# Patient Record
Sex: FEMALE | Race: ASIAN | NOT HISPANIC OR LATINO | Employment: UNEMPLOYED | URBAN - METROPOLITAN AREA
[De-identification: names, ages, dates, MRNs, and addresses within clinical notes are randomized per-mention and may not be internally consistent; named-entity substitution may affect disease eponyms.]

---

## 2018-08-30 ENCOUNTER — HOSPITAL ENCOUNTER (OUTPATIENT)
Dept: RADIOLOGY | Facility: MEDICAL CENTER | Age: 58
End: 2018-08-30
Attending: UROLOGY
Payer: COMMERCIAL

## 2018-08-30 ENCOUNTER — HOSPITAL ENCOUNTER (OUTPATIENT)
Dept: RADIOLOGY | Facility: MEDICAL CENTER | Age: 58
End: 2018-08-30
Attending: INTERNAL MEDICINE
Payer: COMMERCIAL

## 2018-08-30 DIAGNOSIS — N20.0 CALCULUS OF KIDNEY: ICD-10-CM

## 2018-08-30 DIAGNOSIS — Z12.31 VISIT FOR SCREENING MAMMOGRAM: ICD-10-CM

## 2018-08-30 PROCEDURE — 74018 RADEX ABDOMEN 1 VIEW: CPT

## 2018-08-30 PROCEDURE — 77067 SCR MAMMO BI INCL CAD: CPT

## 2019-10-10 ENCOUNTER — HOSPITAL ENCOUNTER (OUTPATIENT)
Dept: RADIOLOGY | Facility: MEDICAL CENTER | Age: 59
End: 2019-10-10
Attending: UROLOGY
Payer: COMMERCIAL

## 2019-10-10 DIAGNOSIS — N20.0 CALCULUS OF KIDNEY: ICD-10-CM

## 2019-10-10 PROCEDURE — 76775 US EXAM ABDO BACK WALL LIM: CPT

## 2019-10-15 ENCOUNTER — HOSPITAL ENCOUNTER (OUTPATIENT)
Dept: RADIOLOGY | Facility: MEDICAL CENTER | Age: 59
End: 2019-10-15
Attending: INTERNAL MEDICINE
Payer: COMMERCIAL

## 2019-10-15 DIAGNOSIS — Z12.31 VISIT FOR SCREENING MAMMOGRAM: ICD-10-CM

## 2019-10-15 PROCEDURE — 77063 BREAST TOMOSYNTHESIS BI: CPT

## 2020-03-02 ENCOUNTER — APPOINTMENT (OUTPATIENT)
Dept: RADIOLOGY | Facility: MEDICAL CENTER | Age: 60
End: 2020-03-02
Attending: INTERNAL MEDICINE
Payer: COMMERCIAL

## 2020-03-19 ENCOUNTER — HOSPITAL ENCOUNTER (OUTPATIENT)
Dept: RADIOLOGY | Facility: MEDICAL CENTER | Age: 60
End: 2020-03-19
Attending: INTERNAL MEDICINE
Payer: COMMERCIAL

## 2020-03-19 DIAGNOSIS — M81.0 SENILE OSTEOPOROSIS: ICD-10-CM

## 2020-03-19 PROCEDURE — 77080 DXA BONE DENSITY AXIAL: CPT

## 2022-01-17 ENCOUNTER — HOSPITAL ENCOUNTER (OUTPATIENT)
Dept: RADIOLOGY | Facility: MEDICAL CENTER | Age: 62
End: 2022-01-17
Attending: INTERNAL MEDICINE
Payer: COMMERCIAL

## 2022-01-17 DIAGNOSIS — Z85.3 PERSONAL HISTORY OF MALIGNANT NEOPLASM OF BREAST: ICD-10-CM

## 2022-01-17 DIAGNOSIS — Z12.31 VISIT FOR SCREENING MAMMOGRAM: ICD-10-CM

## 2022-01-17 PROCEDURE — 77063 BREAST TOMOSYNTHESIS BI: CPT

## 2023-02-06 ENCOUNTER — HOSPITAL ENCOUNTER (OUTPATIENT)
Dept: RADIOLOGY | Facility: MEDICAL CENTER | Age: 63
End: 2023-02-06
Attending: RADIOLOGY
Payer: COMMERCIAL

## 2023-02-06 DIAGNOSIS — Z12.31 VISIT FOR SCREENING MAMMOGRAM: ICD-10-CM

## 2023-02-15 ENCOUNTER — HOSPITAL ENCOUNTER (OUTPATIENT)
Dept: RADIOLOGY | Facility: MEDICAL CENTER | Age: 63
End: 2023-02-15
Attending: INTERNAL MEDICINE
Payer: COMMERCIAL

## 2023-02-15 DIAGNOSIS — N64.59 BREAST SIGNS AND SYMPTOMS: ICD-10-CM

## 2023-02-15 PROCEDURE — 76642 ULTRASOUND BREAST LIMITED: CPT | Mod: LT

## 2023-02-15 PROCEDURE — G0279 TOMOSYNTHESIS, MAMMO: HCPCS

## 2024-02-21 ENCOUNTER — HOSPITAL ENCOUNTER (OUTPATIENT)
Dept: RADIOLOGY | Facility: MEDICAL CENTER | Age: 64
End: 2024-02-21
Attending: INTERNAL MEDICINE
Payer: COMMERCIAL

## 2024-02-21 DIAGNOSIS — Z12.31 ENCOUNTER FOR SCREENING MAMMOGRAM FOR MALIGNANT NEOPLASM OF BREAST: ICD-10-CM

## 2024-02-21 PROCEDURE — 77067 SCR MAMMO BI INCL CAD: CPT

## 2024-09-18 ENCOUNTER — TELEPHONE (OUTPATIENT)
Dept: INTERNAL MEDICINE | Facility: OTHER | Age: 64
End: 2024-09-18
Payer: COMMERCIAL

## 2024-09-18 NOTE — TELEPHONE ENCOUNTER
Patient called to ask if she can establish care with Dr Barnes.  She stated that a friend of hers, a nurse that worked at [facility], recommended Dr Barnes.  She said her name was Salud (patient does not know her last name).  I did inform her that Dr Barnes is typically not accepting new patients but that I would send this encounter still asking.

## 2024-09-30 ENCOUNTER — OFFICE VISIT (OUTPATIENT)
Dept: MEDICAL GROUP | Facility: CLINIC | Age: 64
End: 2024-09-30
Payer: COMMERCIAL

## 2024-09-30 VITALS
WEIGHT: 93.7 LBS | SYSTOLIC BLOOD PRESSURE: 150 MMHG | HEIGHT: 60 IN | DIASTOLIC BLOOD PRESSURE: 74 MMHG | HEART RATE: 74 BPM | OXYGEN SATURATION: 96 % | TEMPERATURE: 98 F | BODY MASS INDEX: 18.4 KG/M2

## 2024-09-30 DIAGNOSIS — R31.21 ASYMPTOMATIC MICROSCOPIC HEMATURIA: ICD-10-CM

## 2024-09-30 DIAGNOSIS — R51.9 NONINTRACTABLE HEADACHE, UNSPECIFIED CHRONICITY PATTERN, UNSPECIFIED HEADACHE TYPE: ICD-10-CM

## 2024-09-30 DIAGNOSIS — M79.18 MUSCLE ACHE OF EXTREMITY: ICD-10-CM

## 2024-09-30 DIAGNOSIS — I10 HYPERTENSION, UNSPECIFIED TYPE: ICD-10-CM

## 2024-09-30 DIAGNOSIS — R07.9 CHEST PAIN, UNSPECIFIED TYPE: ICD-10-CM

## 2024-09-30 DIAGNOSIS — Z23 NEED FOR VACCINATION: ICD-10-CM

## 2024-09-30 DIAGNOSIS — R73.03 PREDIABETES: ICD-10-CM

## 2024-09-30 RX ORDER — CLONIDINE HYDROCHLORIDE 0.1 MG/1
0.1 TABLET ORAL 2 TIMES DAILY
COMMUNITY

## 2024-09-30 RX ORDER — MONTELUKAST SODIUM 10 MG/1
10 TABLET ORAL DAILY
COMMUNITY

## 2024-09-30 RX ORDER — ALENDRONATE SODIUM 70 MG/1
70 TABLET ORAL
Qty: 13 TABLET | Refills: 1 | Status: SHIPPED | OUTPATIENT
Start: 2024-09-30

## 2024-09-30 ASSESSMENT — PATIENT HEALTH QUESTIONNAIRE - PHQ9: CLINICAL INTERPRETATION OF PHQ2 SCORE: 0

## 2024-09-30 NOTE — ASSESSMENT & PLAN NOTE
Pt reports headaches which sounds like tension-type as they come from shoulder and neck. Worse on left side. She reports her clonodine helps.

## 2024-09-30 NOTE — PROGRESS NOTES
Subjective:     CC:  Diagnoses of Need for vaccination, Hypertension, unspecified type, Nonintractable headache, unspecified chronicity pattern, unspecified headache type, Prediabetes, Asymptomatic microscopic hematuria, Chest pain, unspecified type, and Muscle ache of extremity were pertinent to this visit.    HISTORY OF THE PRESENT ILLNESS: Patient is a 64 y.o. female. This pleasant patient is here today to establish care and discuss medications. She used to go to [facility] but the doctor left so she came here.    She needs a refill of osteoporosis medication, she has been out over a week.     Overdue on pap smear.    Breast cancer in 2004    Problem   Hypertension   Headache   Chest Pain   Muscle Ache of Extremity       ROS:   ROS      Objective:       Exam: BP (!) 150/74   Pulse 74   Temp 36.7 °C (98 °F)   Ht 1.524 m (5')   Wt 42.5 kg (93 lb 11.2 oz)   SpO2 96%  Body mass index is 18.3 kg/m².    Physical Exam  Constitutional:       General: She is not in acute distress.     Appearance: Normal appearance. She is not toxic-appearing.   HENT:      Head: Normocephalic and atraumatic.      Mouth/Throat:      Mouth: Mucous membranes are moist.   Eyes:      General: No scleral icterus.     Extraocular Movements: Extraocular movements intact.   Cardiovascular:      Rate and Rhythm: Normal rate and regular rhythm.      Pulses: Normal pulses.      Heart sounds: Normal heart sounds. No murmur heard.     No friction rub. No gallop.   Pulmonary:      Effort: Pulmonary effort is normal. No respiratory distress.      Breath sounds: Normal breath sounds.   Abdominal:      General: Abdomen is flat. Bowel sounds are normal. There is no distension.      Palpations: Abdomen is soft.      Tenderness: There is no abdominal tenderness. There is no guarding.   Musculoskeletal:         General: No swelling.   Skin:     General: Skin is warm and dry.      Capillary Refill: Capillary refill takes less than 2 seconds.   Neurological:  "     General: No focal deficit present.      Mental Status: She is alert and oriented to person, place, and time.   Psychiatric:         Mood and Affect: Mood normal.         Behavior: Behavior normal.         Thought Content: Thought content normal.         Judgment: Judgment normal.           Labs:  ordered    Assessment & Plan:   64 y.o. female with the following -    Problem List Items Addressed This Visit       Chest pain     Occasional chest pain, \"stretching\" and \"burning\" pain lasting 10-15min. Happens 3-4x/mo. No inciting event. First started in 2004 when she was diagnosed with breast cancer - she did receive chemo and radiation. Sometimes radiates from her jaw or abdomen. No SOB.  Never seen a cardiologist.  - In Office EKG  - CXR  - Cardiology         Relevant Orders    EKG - Clinic Performed    DX-CHEST-2 VIEWS    REFERRAL TO CARDIOLOGY    Headache     Pt reports headaches which sounds like tension-type as they come from shoulder and neck. Worse on left side. She reports her clonodine helps.           Hypertension     Need home BP log, then will plan to start ARB and dc clonidine.          Relevant Medications    cloNIDine (CATAPRES) 0.1 MG Tab    Other Relevant Orders    CBC WITH DIFFERENTIAL    Comp Metabolic Panel    HEMOGLOBIN A1C    TSH WITH REFLEX TO FT4    Muscle ache of extremity     Patient reports muscle aches in her legs.  She was told by her previous primary care doctor to try magnesium, which she has not attempted yet.  She agrees to try OTC magnesium.  If this is ineffective, we will follow-up on this issue at the next visit.  -OTC Mg supplement          Other Visit Diagnoses       Need for vaccination        Relevant Orders    INFLUENZA VACCINE TRI INJ (PF)    Prediabetes        Relevant Orders    HEMOGLOBIN A1C    Asymptomatic microscopic hematuria        Relevant Orders    URINALYSIS            I spent a total of 36 minutes with record review, exam, communication with the patient, " communication with other providers, and documentation of this encounter.    No follow-ups on file.

## 2024-09-30 NOTE — ASSESSMENT & PLAN NOTE
"Occasional chest pain, \"stretching\" and \"burning\" pain lasting 10-15min. Happens 3-4x/mo. No inciting event. First started in 2004 when she was diagnosed with breast cancer - she did receive chemo and radiation. Sometimes radiates from her jaw or abdomen. No SOB.  Never seen a cardiologist.  - In Office EKG  - CXR  - Cardiology  "

## 2024-10-01 NOTE — ASSESSMENT & PLAN NOTE
Patient reports muscle aches in her legs.  She was told by her previous primary care doctor to try magnesium, which she has not attempted yet.  She agrees to try OTC magnesium.  If this is ineffective, we will follow-up on this issue at the next visit.  -OTC Mg supplement

## 2024-10-02 ENCOUNTER — HOSPITAL ENCOUNTER (OUTPATIENT)
Dept: LAB | Facility: MEDICAL CENTER | Age: 64
End: 2024-10-02
Payer: COMMERCIAL

## 2024-10-02 DIAGNOSIS — I10 HYPERTENSION, UNSPECIFIED TYPE: ICD-10-CM

## 2024-10-02 DIAGNOSIS — R73.03 PREDIABETES: ICD-10-CM

## 2024-10-02 DIAGNOSIS — R31.21 ASYMPTOMATIC MICROSCOPIC HEMATURIA: ICD-10-CM

## 2024-10-02 LAB
ALBUMIN SERPL BCP-MCNC: 4.2 G/DL (ref 3.2–4.9)
ALBUMIN/GLOB SERPL: 1.4 G/DL
ALP SERPL-CCNC: 60 U/L (ref 30–99)
ALT SERPL-CCNC: 24 U/L (ref 2–50)
ANION GAP SERPL CALC-SCNC: 10 MMOL/L (ref 7–16)
APPEARANCE UR: CLEAR
AST SERPL-CCNC: 27 U/L (ref 12–45)
BASOPHILS # BLD AUTO: 1.7 % (ref 0–1.8)
BASOPHILS # BLD: 0.08 K/UL (ref 0–0.12)
BILIRUB SERPL-MCNC: 0.3 MG/DL (ref 0.1–1.5)
BILIRUB UR QL STRIP.AUTO: NEGATIVE
BUN SERPL-MCNC: 12 MG/DL (ref 8–22)
CALCIUM ALBUM COR SERPL-MCNC: 9.4 MG/DL (ref 8.5–10.5)
CALCIUM SERPL-MCNC: 9.6 MG/DL (ref 8.5–10.5)
CHLORIDE SERPL-SCNC: 104 MMOL/L (ref 96–112)
CO2 SERPL-SCNC: 27 MMOL/L (ref 20–33)
COLOR UR: YELLOW
CREAT SERPL-MCNC: 0.6 MG/DL (ref 0.5–1.4)
EOSINOPHIL # BLD AUTO: 0.09 K/UL (ref 0–0.51)
EOSINOPHIL NFR BLD: 1.9 % (ref 0–6.9)
ERYTHROCYTE [DISTWIDTH] IN BLOOD BY AUTOMATED COUNT: 45.4 FL (ref 35.9–50)
EST. AVERAGE GLUCOSE BLD GHB EST-MCNC: 123 MG/DL
GFR SERPLBLD CREATININE-BSD FMLA CKD-EPI: 101 ML/MIN/1.73 M 2
GLOBULIN SER CALC-MCNC: 2.9 G/DL (ref 1.9–3.5)
GLUCOSE SERPL-MCNC: 97 MG/DL (ref 65–99)
GLUCOSE UR STRIP.AUTO-MCNC: NEGATIVE MG/DL
HBA1C MFR BLD: 5.9 % (ref 4–5.6)
HCT VFR BLD AUTO: 40.5 % (ref 37–47)
HGB BLD-MCNC: 13.6 G/DL (ref 12–16)
IMM GRANULOCYTES # BLD AUTO: 0.01 K/UL (ref 0–0.11)
IMM GRANULOCYTES NFR BLD AUTO: 0.2 % (ref 0–0.9)
KETONES UR STRIP.AUTO-MCNC: NEGATIVE MG/DL
LEUKOCYTE ESTERASE UR QL STRIP.AUTO: NEGATIVE
LYMPHOCYTES # BLD AUTO: 1.55 K/UL (ref 1–4.8)
LYMPHOCYTES NFR BLD: 33 % (ref 22–41)
MCH RBC QN AUTO: 31.6 PG (ref 27–33)
MCHC RBC AUTO-ENTMCNC: 33.6 G/DL (ref 32.2–35.5)
MCV RBC AUTO: 94 FL (ref 81.4–97.8)
MICRO URNS: NORMAL
MONOCYTES # BLD AUTO: 0.36 K/UL (ref 0–0.85)
MONOCYTES NFR BLD AUTO: 7.7 % (ref 0–13.4)
NEUTROPHILS # BLD AUTO: 2.6 K/UL (ref 1.82–7.42)
NEUTROPHILS NFR BLD: 55.5 % (ref 44–72)
NITRITE UR QL STRIP.AUTO: NEGATIVE
NRBC # BLD AUTO: 0 K/UL
NRBC BLD-RTO: 0 /100 WBC (ref 0–0.2)
PH UR STRIP.AUTO: 6 [PH] (ref 5–8)
PLATELET # BLD AUTO: 323 K/UL (ref 164–446)
PMV BLD AUTO: 10.1 FL (ref 9–12.9)
POTASSIUM SERPL-SCNC: 4 MMOL/L (ref 3.6–5.5)
PROT SERPL-MCNC: 7.1 G/DL (ref 6–8.2)
PROT UR QL STRIP: NEGATIVE MG/DL
RBC # BLD AUTO: 4.31 M/UL (ref 4.2–5.4)
RBC UR QL AUTO: NEGATIVE
SODIUM SERPL-SCNC: 141 MMOL/L (ref 135–145)
SP GR UR STRIP.AUTO: 1.02
UROBILINOGEN UR STRIP.AUTO-MCNC: 0.2 MG/DL
WBC # BLD AUTO: 4.7 K/UL (ref 4.8–10.8)

## 2024-10-02 PROCEDURE — 80053 COMPREHEN METABOLIC PANEL: CPT

## 2024-10-02 PROCEDURE — 83036 HEMOGLOBIN GLYCOSYLATED A1C: CPT

## 2024-10-02 PROCEDURE — 81003 URINALYSIS AUTO W/O SCOPE: CPT

## 2024-10-02 PROCEDURE — 85025 COMPLETE CBC W/AUTO DIFF WBC: CPT

## 2024-10-02 PROCEDURE — 36415 COLL VENOUS BLD VENIPUNCTURE: CPT

## 2024-10-07 ENCOUNTER — HOSPITAL ENCOUNTER (OUTPATIENT)
Facility: MEDICAL CENTER | Age: 64
End: 2024-10-07
Payer: COMMERCIAL

## 2024-10-07 ENCOUNTER — OFFICE VISIT (OUTPATIENT)
Dept: MEDICAL GROUP | Facility: CLINIC | Age: 64
End: 2024-10-07
Payer: COMMERCIAL

## 2024-10-07 VITALS
HEIGHT: 60 IN | WEIGHT: 93.4 LBS | BODY MASS INDEX: 18.34 KG/M2 | OXYGEN SATURATION: 95 % | DIASTOLIC BLOOD PRESSURE: 82 MMHG | HEART RATE: 75 BPM | SYSTOLIC BLOOD PRESSURE: 136 MMHG | TEMPERATURE: 97.2 F

## 2024-10-07 DIAGNOSIS — N88.9 CERVIX ABNORMALITY: ICD-10-CM

## 2024-10-07 DIAGNOSIS — Z12.4 CERVICAL CANCER SCREENING: ICD-10-CM

## 2024-10-07 DIAGNOSIS — R73.03 PREDIABETES: ICD-10-CM

## 2024-10-07 DIAGNOSIS — I10 HYPERTENSION, UNSPECIFIED TYPE: ICD-10-CM

## 2024-10-07 PROCEDURE — 3079F DIAST BP 80-89 MM HG: CPT

## 2024-10-07 PROCEDURE — 87624 HPV HI-RISK TYP POOLED RSLT: CPT

## 2024-10-07 PROCEDURE — 99214 OFFICE O/P EST MOD 30 MIN: CPT

## 2024-10-07 PROCEDURE — 3075F SYST BP GE 130 - 139MM HG: CPT

## 2024-10-07 PROCEDURE — 88175 CYTOPATH C/V AUTO FLUID REDO: CPT

## 2024-10-07 PROCEDURE — 87591 N.GONORRHOEAE DNA AMP PROB: CPT

## 2024-10-07 PROCEDURE — 87491 CHLMYD TRACH DNA AMP PROBE: CPT

## 2024-10-07 ASSESSMENT — FIBROSIS 4 INDEX: FIB4 SCORE: 1.07

## 2024-10-12 LAB
C TRACH RRNA CVX QL NAA+PROBE: NEGATIVE
CYTOLOGIST CVX/VAG CYTO: NORMAL
CYTOLOGY CVX/VAG DOC CYTO: NORMAL
CYTOLOGY CVX/VAG DOC THIN PREP: NORMAL
HPV I/H RISK 4 DNA CVX QL PROBE+SIG AMP: NEGATIVE
N GONORRHOEA RRNA CVX QL NAA+PROBE: NEGATIVE
NOTE NL11727A: NORMAL
OTHER STN SPEC: NORMAL
STAT OF ADQ CVX/VAG CYTO-IMP: NORMAL

## 2024-10-15 ENCOUNTER — TELEPHONE (OUTPATIENT)
Dept: MEDICAL GROUP | Facility: CLINIC | Age: 64
End: 2024-10-15
Payer: COMMERCIAL

## 2024-10-19 ENCOUNTER — HOSPITAL ENCOUNTER (OUTPATIENT)
Dept: RADIOLOGY | Facility: MEDICAL CENTER | Age: 64
End: 2024-10-19
Payer: COMMERCIAL

## 2024-10-19 DIAGNOSIS — R07.9 CHEST PAIN, UNSPECIFIED TYPE: ICD-10-CM

## 2024-10-19 PROCEDURE — 71046 X-RAY EXAM CHEST 2 VIEWS: CPT

## 2024-11-19 ENCOUNTER — TELEPHONE (OUTPATIENT)
Dept: CARDIOLOGY | Facility: MEDICAL CENTER | Age: 64
End: 2024-11-19
Payer: COMMERCIAL

## 2024-11-21 ENCOUNTER — OFFICE VISIT (OUTPATIENT)
Dept: CARDIOLOGY | Facility: MEDICAL CENTER | Age: 64
End: 2024-11-21
Payer: COMMERCIAL

## 2024-11-21 VITALS
HEART RATE: 78 BPM | SYSTOLIC BLOOD PRESSURE: 136 MMHG | DIASTOLIC BLOOD PRESSURE: 65 MMHG | RESPIRATION RATE: 14 BRPM | BODY MASS INDEX: 18.61 KG/M2 | HEIGHT: 60 IN | WEIGHT: 94.8 LBS | OXYGEN SATURATION: 98 %

## 2024-11-21 DIAGNOSIS — R07.9 CHEST PAIN, UNSPECIFIED TYPE: ICD-10-CM

## 2024-11-21 PROCEDURE — 99204 OFFICE O/P NEW MOD 45 MIN: CPT | Performed by: INTERNAL MEDICINE

## 2024-11-21 PROCEDURE — 99211 OFF/OP EST MAY X REQ PHY/QHP: CPT | Performed by: INTERNAL MEDICINE

## 2024-11-21 ASSESSMENT — ENCOUNTER SYMPTOMS
FEVER: 0
FALLS: 0
BLURRED VISION: 0
SENSORY CHANGE: 0
PALPITATIONS: 0
VOMITING: 0
PND: 0
SHORTNESS OF BREATH: 0
DEPRESSION: 0
ABDOMINAL PAIN: 0
DOUBLE VISION: 0
MYALGIAS: 0
HALLUCINATIONS: 0
DIZZINESS: 0
EYE DISCHARGE: 0
ORTHOPNEA: 0
BRUISES/BLEEDS EASILY: 0
WEIGHT LOSS: 0
CLAUDICATION: 0
EYE PAIN: 0
HEADACHES: 0
LOSS OF CONSCIOUSNESS: 0
COUGH: 0
CHILLS: 0
NAUSEA: 0
BLOOD IN STOOL: 0
SPEECH CHANGE: 0

## 2024-11-21 ASSESSMENT — FIBROSIS 4 INDEX: FIB4 SCORE: 1.07

## 2024-11-21 NOTE — PROGRESS NOTES
Chief Complaint   Patient presents with    Chest Pain     NP Dx: Chest pain, unspecified type       Subjective     Blaine Weir is a 64 y.o. female who presents today for cardiac care and evaluation for chest pain. Patient has chest pain at sporadic times. No specific precipitating factors or worsening factors. Chest pain is described to be pressure-like sensation however localized at anterior chest without radition. Lasting for seconds to minutes. No prior cardiac problems. No prior cardiac workup.    No family history of sudden cardiac death.      History reviewed. No pertinent past medical history.  History reviewed. No pertinent surgical history.  History reviewed. No pertinent family history.  Social History     Socioeconomic History    Marital status: Unknown     Spouse name: Not on file    Number of children: Not on file    Years of education: Not on file    Highest education level: Not on file   Occupational History    Not on file   Tobacco Use    Smoking status: Never    Smokeless tobacco: Never   Substance and Sexual Activity    Alcohol use: Never    Drug use: Never    Sexual activity: Not on file   Other Topics Concern    Not on file   Social History Narrative    Not on file     Social Drivers of Health     Financial Resource Strain: Not on file   Food Insecurity: Not on file   Transportation Needs: Not on file   Physical Activity: Not on file   Stress: Not on file   Social Connections: Not on file   Intimate Partner Violence: Not on file   Housing Stability: Not on file     Allergies   Allergen Reactions    Morphine Itching and Unspecified     Outpatient Encounter Medications as of 11/21/2024   Medication Sig Dispense Refill    alendronate (FOSAMAX) 70 MG Tab Take 1 Tablet by mouth every 7 days. 13 Tablet 1    cloNIDine (CATAPRES) 0.1 MG Tab Take 0.1 mg by mouth 2 times a day. (Patient not taking: Reported on 11/21/2024)      ASPIRIN 81 PO Take  by mouth. (Patient not taking: Reported on 11/21/2024)       montelukast (SINGULAIR) 10 MG Tab Take 10 mg by mouth every day. (Patient not taking: Reported on 11/21/2024)       No facility-administered encounter medications on file as of 11/21/2024.     Review of Systems   Constitutional:  Negative for chills, fever, malaise/fatigue and weight loss.   HENT:  Negative for ear discharge, ear pain, hearing loss and nosebleeds.    Eyes:  Negative for blurred vision, double vision, pain and discharge.   Respiratory:  Negative for cough and shortness of breath.    Cardiovascular:  Positive for chest pain. Negative for palpitations, orthopnea, claudication, leg swelling and PND.   Gastrointestinal:  Negative for abdominal pain, blood in stool, melena, nausea and vomiting.   Genitourinary:  Negative for dysuria and hematuria.   Musculoskeletal:  Negative for falls, joint pain and myalgias.   Skin:  Negative for itching and rash.   Neurological:  Negative for dizziness, sensory change, speech change, loss of consciousness and headaches.   Endo/Heme/Allergies:  Negative for environmental allergies. Does not bruise/bleed easily.   Psychiatric/Behavioral:  Negative for depression, hallucinations and suicidal ideas.               Objective     /65 (BP Location: Left arm, Patient Position: Sitting, BP Cuff Size: Adult)   Pulse 78   Resp 14   Ht 1.524 m (5')   Wt 43 kg (94 lb 12.8 oz)   SpO2 98%   BMI 18.51 kg/m²     Physical Exam  Vitals and nursing note reviewed.   Constitutional:       General: She is not in acute distress.     Appearance: She is not diaphoretic.   HENT:      Head: Normocephalic and atraumatic.      Right Ear: External ear normal.      Left Ear: External ear normal.      Nose: No congestion or rhinorrhea.   Eyes:      General:         Right eye: No discharge.         Left eye: No discharge.   Neck:      Thyroid: No thyromegaly.      Vascular: No JVD.   Cardiovascular:      Rate and Rhythm: Normal rate and regular rhythm.      Pulses: Normal pulses.    Pulmonary:      Effort: No respiratory distress.   Abdominal:      General: There is no distension.      Tenderness: There is no abdominal tenderness.   Musculoskeletal:         General: No swelling or tenderness.      Right lower leg: No edema.      Left lower leg: No edema.   Skin:     General: Skin is warm and dry.   Neurological:      Mental Status: She is alert and oriented to person, place, and time.      Cranial Nerves: No cranial nerve deficit.   Psychiatric:         Behavior: Behavior normal.                Assessment & Plan     1. Chest pain, unspecified type  EC-ECHOCARDIOGRAM COMPLETE W/O CONT    NM-CARDIAC TREADMILL ONLY-NO IMAGING    CANCELED: EKG          Medical Decision Making: Today's Assessment/Status/Plan:   At this time, to further risk stratify, I will order a transthoracic echocardiogram to assess cardiac and valvular functions. I will also order a treadmill stress test (to avoid radiation exposure in young patients) to assess for coronary ischemia.    This visit encounter signifies the visit complexity inherent to evaluation and management associated with medical care services that serve as the continuing focal point for all needed health care services and/or with medical care services that are part of ongoing care related to this patient's single, serious condition, complex cardiac condition.    Jesica Chu M.D.

## 2025-01-08 ENCOUNTER — HOSPITAL ENCOUNTER (OUTPATIENT)
Dept: RADIOLOGY | Facility: MEDICAL CENTER | Age: 65
End: 2025-01-08
Attending: INTERNAL MEDICINE
Payer: COMMERCIAL

## 2025-01-08 DIAGNOSIS — R07.9 CHEST PAIN, UNSPECIFIED TYPE: ICD-10-CM

## 2025-01-08 PROCEDURE — 93018 CV STRESS TEST I&R ONLY: CPT | Performed by: INTERNAL MEDICINE

## 2025-01-08 PROCEDURE — 93017 CV STRESS TEST TRACING ONLY: CPT | Mod: TC

## 2025-01-14 NOTE — PROGRESS NOTES
Gynecology Visit        CC: Cervical abnormality on pelvic exam      DHAVAL Weir is a 64 y.o. female  presenting today for the above.  Patient referred by her PCP for abnormal cervix on pelvic exam. Per note: Parous, friable, patch of well demarcated erythematous area on left aspect of cervix in 3:00 position, communicating with transition zone.     Patient menopausal, denies any postmenopausal bleeding.  Denies any pelvic pain or discharge.  Denies history of abnormal Pap smears.      Imaging  none    Menstrual History  No LMP recorded.  Menopausal     Gynecologic History  Last pap:  NILM/HPV neg        OB History    Para Term  AB Living   3 1 1   2 1   SAB IAB Ectopic Molar Multiple Live Births             1      # Outcome Date GA Lbr Balta/2nd Weight Sex Type Anes PTL Lv   3 Term 93    F Vag-Spont   LAURYN   2 AB            1 AB                Past Medical History  Past Medical History:   Diagnosis Date    Breast cancer (HCC)        Past Surgical History  History reviewed. No pertinent surgical history.    Social History  Social History     Tobacco Use    Smoking status: Never    Smokeless tobacco: Never   Vaping Use    Vaping status: Never Used   Substance Use Topics    Alcohol use: Never    Drug use: Never        Family History  History reviewed. No pertinent family history.    Home Medications  Current Outpatient Medications   Medication Sig    alendronate (FOSAMAX) 70 MG Tab Take 1 Tablet by mouth every 7 days.       Allergies/Reactions  Allergies   Allergen Reactions    Morphine Itching and Unspecified          ROS: I have reviewed all systems with patient. Pertinent positive and negative findings are listed below except for what is otherwise stated in the History of Present Illness.       Objective:    Labs  Lab Results   Component Value Date/Time    WBC 4.7 (L) 10/02/2024 02:42 PM    RBC 4.31 10/02/2024 02:42 PM    HEMOGLOBIN 13.6 10/02/2024 02:42 PM    HEMATOCRIT 40.5  10/02/2024 02:42 PM    MCV 94.0 10/02/2024 02:42 PM    MCH 31.6 10/02/2024 02:42 PM    MCHC 33.6 10/02/2024 02:42 PM    RDW 45.4 10/02/2024 02:42 PM    PLATELETCT 323 10/02/2024 02:42 PM    MPV 10.1 10/02/2024 02:42 PM    NEUTSPOLYS 55.50 10/02/2024 02:42 PM    LYMPHOCYTES 33.00 10/02/2024 02:42 PM    MONOCYTES 7.70 10/02/2024 02:42 PM    EOSINOPHILS 1.90 10/02/2024 02:42 PM    BASOPHILS 1.70 10/02/2024 02:42 PM    IMMGRAN 0.20 10/02/2024 02:42 PM    NRBC 0.00 10/02/2024 02:42 PM    NEUTS 2.60 10/02/2024 02:42 PM    LYMPHS 1.55 10/02/2024 02:42 PM    MONOS 0.36 10/02/2024 02:42 PM    EOS 0.09 10/02/2024 02:42 PM    BASO 0.08 10/02/2024 02:42 PM    IMMGRANAB 0.01 10/02/2024 02:42 PM    NRBCAB 0.00 10/02/2024 02:42 PM       Lab Results   Component Value Date/Time    HBA1C 5.9 (H) 10/02/2024 02:42 PM         Physical Exam  /70 (BP Location: Left arm, Patient Position: Sitting, BP Cuff Size: Adult)   Pulse 73   Ht 5'   Wt 94 lb   BMI 18.36 kg/m²    No LMP recorded.  Body mass index is 18.36 kg/m².    General: alert, well appearing, and in no distress  Neurological: alert, oriented, normal speech, no focal findings or movement disorder noted  Respiratory: no tachypnea, retractions or cyanosis    Pelvic exam:   external genitalia: normal appearance  urinary system: urethral meatus normal  vaginal: normal mucosa without prolapse or lesions  cervix: Small endocervical polyp  adnexa: normal on bimanual exam  uterus: normal size , normal contour , mobile, non-tender    Female chaperone present - K Griffin        Assessment:  64-year-old menopausal  Endocervical polyp  Not bleeding  Normal Pap smears         Plan:  1. Endocervical polyp  Cervical polyp biopsied.  If normal biopsy, plan for surveillance.  Patient should notify us if she has any postmenopausal bleeding.  In that case, would do a pelvic ultrasound.  - Pathology Specimen; Future         Berenice Olmstead MD

## 2025-01-15 ENCOUNTER — APPOINTMENT (OUTPATIENT)
Dept: GYNECOLOGY | Facility: CLINIC | Age: 65
End: 2025-01-15
Payer: COMMERCIAL

## 2025-01-15 ENCOUNTER — HOSPITAL ENCOUNTER (OUTPATIENT)
Facility: MEDICAL CENTER | Age: 65
End: 2025-01-15
Attending: STUDENT IN AN ORGANIZED HEALTH CARE EDUCATION/TRAINING PROGRAM
Payer: COMMERCIAL

## 2025-01-15 VITALS
HEART RATE: 73 BPM | HEIGHT: 60 IN | WEIGHT: 94 LBS | BODY MASS INDEX: 18.46 KG/M2 | SYSTOLIC BLOOD PRESSURE: 135 MMHG | DIASTOLIC BLOOD PRESSURE: 70 MMHG

## 2025-01-15 DIAGNOSIS — N84.1 ENDOCERVICAL POLYP: ICD-10-CM

## 2025-01-15 LAB — PATHOLOGY CONSULT NOTE: NORMAL

## 2025-01-15 PROCEDURE — 3075F SYST BP GE 130 - 139MM HG: CPT | Performed by: STUDENT IN AN ORGANIZED HEALTH CARE EDUCATION/TRAINING PROGRAM

## 2025-01-15 PROCEDURE — 88305 TISSUE EXAM BY PATHOLOGIST: CPT | Mod: 26 | Performed by: PATHOLOGY

## 2025-01-15 PROCEDURE — 99204 OFFICE O/P NEW MOD 45 MIN: CPT | Performed by: STUDENT IN AN ORGANIZED HEALTH CARE EDUCATION/TRAINING PROGRAM

## 2025-01-15 PROCEDURE — 88305 TISSUE EXAM BY PATHOLOGIST: CPT | Performed by: PATHOLOGY

## 2025-01-15 PROCEDURE — 3078F DIAST BP <80 MM HG: CPT | Performed by: STUDENT IN AN ORGANIZED HEALTH CARE EDUCATION/TRAINING PROGRAM

## 2025-01-15 PROCEDURE — 99459 PELVIC EXAMINATION: CPT | Performed by: STUDENT IN AN ORGANIZED HEALTH CARE EDUCATION/TRAINING PROGRAM

## 2025-01-15 ASSESSMENT — FIBROSIS 4 INDEX: FIB4 SCORE: 1.07

## 2025-01-15 NOTE — PROGRESS NOTES
Pt here for abnormal cervix  Last PAP: 10/7/24  Colonoscopy: 8 yrs ago   Pharmacy confirmed.   Good # [phone]

## 2025-02-04 ENCOUNTER — HOSPITAL ENCOUNTER (OUTPATIENT)
Dept: CARDIOLOGY | Facility: MEDICAL CENTER | Age: 65
End: 2025-02-04
Attending: INTERNAL MEDICINE
Payer: COMMERCIAL

## 2025-02-04 DIAGNOSIS — R07.9 CHEST PAIN, UNSPECIFIED TYPE: ICD-10-CM

## 2025-02-04 PROCEDURE — 93306 TTE W/DOPPLER COMPLETE: CPT

## 2025-02-05 LAB
LV EJECT FRACT  99904: 50
LV EJECT FRACT MOD 2C 99903: 52.87
LV EJECT FRACT MOD 4C 99902: 37.67
LV EJECT FRACT MOD BP 99901: 46.06

## 2025-02-05 PROCEDURE — 93306 TTE W/DOPPLER COMPLETE: CPT | Mod: 26 | Performed by: INTERNAL MEDICINE

## 2025-02-06 NOTE — RESULT ENCOUNTER NOTE
Dear Meghann,    Can you please let Blaine Carmella know that result is ok and I will see patient as scheduled?    Thank you,  Anselmo.

## 2025-02-25 NOTE — TELEPHONE ENCOUNTER
Received request via: Pharmacy    Was the patient seen in the last year in this department? Yes    Does the patient have an active prescription (recently filled or refills available) for medication(s) requested? No    Pharmacy Name:   [facility]/pharmacy #[id number] - [place], [place] - [address]

## 2025-02-28 RX ORDER — ALENDRONATE SODIUM 70 MG/1
70 TABLET ORAL
Qty: 12 TABLET | Refills: 0 | Status: SHIPPED | OUTPATIENT
Start: 2025-02-28

## 2025-03-10 ENCOUNTER — HOSPITAL ENCOUNTER (OUTPATIENT)
Dept: RADIOLOGY | Facility: MEDICAL CENTER | Age: 65
End: 2025-03-10
Attending: INTERNAL MEDICINE
Payer: COMMERCIAL

## 2025-03-10 DIAGNOSIS — Z12.31 ENCOUNTER FOR SCREENING MAMMOGRAM FOR BREAST CANCER: ICD-10-CM

## 2025-03-10 PROCEDURE — 77067 SCR MAMMO BI INCL CAD: CPT

## 2025-03-30 ENCOUNTER — RESULTS FOLLOW-UP (OUTPATIENT)
Dept: MEDICAL GROUP | Facility: CLINIC | Age: 65
End: 2025-03-30

## 2025-04-08 ENCOUNTER — APPOINTMENT (OUTPATIENT)
Dept: MEDICAL GROUP | Facility: CLINIC | Age: 65
End: 2025-04-08
Payer: COMMERCIAL

## 2025-04-08 VITALS
HEIGHT: 60 IN | HEART RATE: 64 BPM | BODY MASS INDEX: 18.42 KG/M2 | DIASTOLIC BLOOD PRESSURE: 77 MMHG | TEMPERATURE: 98.6 F | WEIGHT: 93.8 LBS | SYSTOLIC BLOOD PRESSURE: 154 MMHG | OXYGEN SATURATION: 96 %

## 2025-04-08 DIAGNOSIS — I10 HYPERTENSION, UNSPECIFIED TYPE: ICD-10-CM

## 2025-04-08 DIAGNOSIS — Z23 NEED FOR VACCINATION: ICD-10-CM

## 2025-04-08 DIAGNOSIS — R79.89 ELEVATED TSH: ICD-10-CM

## 2025-04-08 DIAGNOSIS — R73.03 PREDIABETES: ICD-10-CM

## 2025-04-08 DIAGNOSIS — M81.0 OSTEOPOROSIS WITHOUT CURRENT PATHOLOGICAL FRACTURE, UNSPECIFIED OSTEOPOROSIS TYPE: ICD-10-CM

## 2025-04-08 PROCEDURE — 90677 PCV20 VACCINE IM: CPT

## 2025-04-08 PROCEDURE — 90472 IMMUNIZATION ADMIN EACH ADD: CPT

## 2025-04-08 PROCEDURE — 3078F DIAST BP <80 MM HG: CPT

## 2025-04-08 PROCEDURE — 3077F SYST BP >= 140 MM HG: CPT

## 2025-04-08 PROCEDURE — 90715 TDAP VACCINE 7 YRS/> IM: CPT

## 2025-04-08 PROCEDURE — 90471 IMMUNIZATION ADMIN: CPT

## 2025-04-08 PROCEDURE — 99213 OFFICE O/P EST LOW 20 MIN: CPT | Mod: 25

## 2025-04-08 ASSESSMENT — PATIENT HEALTH QUESTIONNAIRE - PHQ9
SUM OF ALL RESPONSES TO PHQ QUESTIONS 1-9: 4
CLINICAL INTERPRETATION OF PHQ2 SCORE: 1
5. POOR APPETITE OR OVEREATING: 1 - SEVERAL DAYS

## 2025-04-08 ASSESSMENT — FIBROSIS 4 INDEX: FIB4 SCORE: 1.07

## 2025-04-08 NOTE — PROGRESS NOTES
"This note is formatted in an APSO format, for additional subjective and objective evaluation please scroll to the bottom of the note.    CC:  Chief Complaint   Patient presents with    Follow-Up     Regular check up     Assessment/Plan:  Problem List Items Addressed This Visit       Hypertension    /77 in clinic today. LVEF 50% on echo.  - pt declines ARB         Osteoporosis    Taking alendronate for \"a couple years\"     Started alendronate 10-15 years ago. Stopped taking it 2-3 years ago. PCP at [facility] restarted a year ago or so.    2020 DEXA: spine=-2.6, hip=-2.5  2023 DEXA: spine=-3.4, hip=-2.5    - continue alendronate  - check iCa and Vit D  - repeat DEXA         Relevant Orders    VITAMIN D,25 HYDROXY (DEFICIENCY)    IONIZED CALCIUM    DS-BONE DENSITY STUDY (DEXA)    Prediabetes    Relevant Orders    HEMOGLOBIN A1C     Other Visit Diagnoses         Need for vaccination        Relevant Orders    Tdap Vaccine =>6YO IM (Completed)    Pneumococcal Conjugate Vaccine 20-Valent (6 wks+) (Completed)      Elevated TSH        Relevant Orders    TSH+FREE T4           Orders Placed This Encounter    DS-BONE DENSITY STUDY (DEXA)    Tdap Vaccine =>6YO IM    Pneumococcal Conjugate Vaccine 20-Valent (6 wks+)    TSH+FREE T4    HEMOGLOBIN A1C    VITAMIN D,25 HYDROXY (DEFICIENCY)    IONIZED CALCIUM       HISTORY OF PRESENT ILLNESS:   Feels tired. Losing hair. Skin is okay. Loose stools    .        Problem   Osteoporosis   Hypertension         Exam:    BP (!) 154/77 (BP Location: Left arm, Patient Position: Sitting, BP Cuff Size: Adult)   Pulse 64   Temp 37 °C (98.6 °F) (Temporal)   Ht 1.524 m (5')   Wt 42.5 kg (93 lb 12.8 oz)   SpO2 96%   BMI 18.32 kg/m²  Body mass index is 18.32 kg/m².    Gen: Well appearing. No apparent distress. Well developed. Sitting comfortably on chair  HEENT: NCAT, MMM  Neck: Supple, FROM  Chest: No deformities, Equal chest expansion  Lungs: Normal effort, CTA " bilaterally.  CV: Regular rate and rhythm. Pulse palpable. No murmur  Abd: Non-distended.   Ext: No cyanosis. No edema.  Skin: Warm/dry. No visible rashes.  Neuro: Non-focal. A&Ox4.  Psych: Normal behavior, normal affect    No follow-ups on file.    Ric Jackson MD  [facility]

## 2025-04-10 NOTE — ASSESSMENT & PLAN NOTE
"Taking alendronate for \"a couple years\"     Started alendronate 10-15 years ago. Stopped taking it 2-3 years ago. PCP at [facility] restarted a year ago or so.    2020 DEXA: spine=-2.6, hip=-2.5  2023 DEXA: spine=-3.4, hip=-2.5    - continue alendronate  - check iCa and Vit D  - repeat DEXA  "

## 2025-04-24 ENCOUNTER — HOSPITAL ENCOUNTER (OUTPATIENT)
Dept: RADIOLOGY | Facility: MEDICAL CENTER | Age: 65
End: 2025-04-24
Payer: COMMERCIAL

## 2025-04-24 ENCOUNTER — HOSPITAL ENCOUNTER (OUTPATIENT)
Dept: LAB | Facility: MEDICAL CENTER | Age: 65
End: 2025-04-24
Payer: COMMERCIAL

## 2025-04-24 DIAGNOSIS — R73.03 PREDIABETES: ICD-10-CM

## 2025-04-24 DIAGNOSIS — M81.0 OSTEOPOROSIS WITHOUT CURRENT PATHOLOGICAL FRACTURE, UNSPECIFIED OSTEOPOROSIS TYPE: ICD-10-CM

## 2025-04-24 LAB
25(OH)D3 SERPL-MCNC: 72 NG/ML (ref 30–100)
CA-I SERPL-SCNC: 1.2 MMOL/L (ref 1.1–1.3)
EST. AVERAGE GLUCOSE BLD GHB EST-MCNC: 146 MG/DL
HBA1C MFR BLD: 6.7 % (ref 4–5.6)
T4 FREE SERPL-MCNC: 0.93 NG/DL (ref 0.93–1.7)
TSH SERPL-ACNC: 2.52 UIU/ML (ref 0.38–5.33)

## 2025-04-24 PROCEDURE — 83036 HEMOGLOBIN GLYCOSYLATED A1C: CPT

## 2025-04-24 PROCEDURE — 36415 COLL VENOUS BLD VENIPUNCTURE: CPT

## 2025-04-24 PROCEDURE — 84443 ASSAY THYROID STIM HORMONE: CPT

## 2025-04-24 PROCEDURE — 82306 VITAMIN D 25 HYDROXY: CPT

## 2025-04-24 PROCEDURE — 82330 ASSAY OF CALCIUM: CPT

## 2025-04-24 PROCEDURE — 84439 ASSAY OF FREE THYROXINE: CPT

## 2025-04-24 PROCEDURE — 77080 DXA BONE DENSITY AXIAL: CPT

## 2025-04-25 ENCOUNTER — RESULTS FOLLOW-UP (OUTPATIENT)
Dept: MEDICAL GROUP | Facility: CLINIC | Age: 65
End: 2025-04-25

## 2025-04-27 ENCOUNTER — RESULTS FOLLOW-UP (OUTPATIENT)
Dept: MEDICAL GROUP | Facility: CLINIC | Age: 65
End: 2025-04-27

## 2025-04-27 DIAGNOSIS — M81.0 OSTEOPOROSIS WITHOUT CURRENT PATHOLOGICAL FRACTURE, UNSPECIFIED OSTEOPOROSIS TYPE: ICD-10-CM

## 2025-04-27 NOTE — PROGRESS NOTES
Pt started alendronate 10-15 years ago. Stopped taking it 2-3 years ago. Previous PCP restarted a year ago or so.     2020 DEXA: spine=-2.6, hip=-2.5  2023 DEXA: spine=-3.4, hip=-2.5  2025 DEXA: spine=-2.7, hip=-2.5    Calcium and vitamin D are stable.  - Considering d/c'ing alendronate however she did not seem to respond well to discontinuation previously. Given her history of breast cancer (in remission), it may be worth trying raloxifene. Will request assistance from endocrinology for this decision.

## 2025-05-13 ENCOUNTER — E-CONSULT (OUTPATIENT)
Dept: ENDOCRINOLOGY | Facility: MEDICAL CENTER | Age: 65
End: 2025-05-13
Payer: COMMERCIAL

## 2025-05-13 DIAGNOSIS — Z71.9 ENCOUNTER FOR CONSULTATION: ICD-10-CM

## 2025-05-14 NOTE — PROGRESS NOTES
E-Consult Response     After careful review of the patient's information available in the medical record, the following are my findings and recommendations:    Reason for consult: Treatment recommendations for patient with osteoporosis    Summary of data reviewed: In summary this is a 64-year-old female born on September 24, 1960 with diagnosis of osteoporosis.  She took alendronate 10 to 15 years ago then stopped the medication for 2 to 3 years and then another doctor restarted her on the medication.      Her bone density in 2020 and [facility] showed the lowest T-score of -2.6 for the lumbar spine compatible with osteoporosis    A bone density done at another institution probably [facility] in 2023 showed the lowest T-score of -3.4 for the lumbar spine      And then a bone density in 2025 at [facility] showed the lowest T-score of -2.7 for the lumbar spine with no evidence of significant bone loss when compared to the 2020 study      Currently she is taking alendronate    Her vitamin D is normal    TSH is normal    No recent calcium level or PTH level    Ionized calcium was normal    Providers asking recommendations      Recommendations: In summary this is a patient who took alendronate in the past had a drug holiday and then restarted alendronate again there is no evidence that she is having side effects or oral intolerance of the medication or GI adverse effects    There is no evidence of treatment failure    There does not seem to be any evidence of GI intolerance to oral bisphosphonates    No evidence of fragility fractures while on therapy    Although the bone density in 2023 was done in a different institution compared to 2025---There was a drastic improvement in the T-scores in the lumbar spine suggesting improvement in bone mineral density      Since there is indirect evidence of success with oral bisphosphonate therapy I would not discontinue the medication and continue with alendronate but only for  approximately 5 years this time around and stress to the patient that we do not recommend prolonged treatment with this medication due to the risk of atypical femoral fractures and we usually do a drug holiday after 5 years    (Technically in the past she did a drug holiday of 2 to 3 years which is appropriate)    E-Consult Time: 31 minutes were spent with >50% of the total time spent discussing plan of care with requesting physician (Use code [id number])    Kimo Pantoja M.D.

## 2025-05-21 ENCOUNTER — OFFICE VISIT (OUTPATIENT)
Dept: CARDIOLOGY | Facility: MEDICAL CENTER | Age: 65
End: 2025-05-21
Attending: INTERNAL MEDICINE
Payer: COMMERCIAL

## 2025-05-21 VITALS
SYSTOLIC BLOOD PRESSURE: 152 MMHG | BODY MASS INDEX: 18.26 KG/M2 | DIASTOLIC BLOOD PRESSURE: 74 MMHG | WEIGHT: 93 LBS | HEIGHT: 60 IN | RESPIRATION RATE: 16 BRPM | HEART RATE: 68 BPM | OXYGEN SATURATION: 97 %

## 2025-05-21 DIAGNOSIS — E11.65 TYPE 2 DIABETES MELLITUS WITH HYPERGLYCEMIA, WITHOUT LONG-TERM CURRENT USE OF INSULIN (HCC): ICD-10-CM

## 2025-05-21 DIAGNOSIS — I10 HTN (HYPERTENSION), MALIGNANT: Primary | ICD-10-CM

## 2025-05-21 PROCEDURE — 3078F DIAST BP <80 MM HG: CPT | Performed by: INTERNAL MEDICINE

## 2025-05-21 PROCEDURE — 99214 OFFICE O/P EST MOD 30 MIN: CPT | Performed by: INTERNAL MEDICINE

## 2025-05-21 PROCEDURE — 3077F SYST BP >= 140 MM HG: CPT | Performed by: INTERNAL MEDICINE

## 2025-05-21 PROCEDURE — 99213 OFFICE O/P EST LOW 20 MIN: CPT | Performed by: INTERNAL MEDICINE

## 2025-05-21 RX ORDER — VALSARTAN 160 MG/1
160 TABLET ORAL DAILY
Qty: 100 TABLET | Refills: 4 | Status: SHIPPED | OUTPATIENT
Start: 2025-05-21

## 2025-05-21 ASSESSMENT — ENCOUNTER SYMPTOMS
ORTHOPNEA: 0
SPEECH CHANGE: 0
MYALGIAS: 0
EYE PAIN: 0
EYE DISCHARGE: 0
NAUSEA: 0
FEVER: 0
DEPRESSION: 0
SHORTNESS OF BREATH: 0
BLURRED VISION: 0
COUGH: 0
PND: 0
HALLUCINATIONS: 0
SENSORY CHANGE: 0
ABDOMINAL PAIN: 0
PALPITATIONS: 0
LOSS OF CONSCIOUSNESS: 0
FALLS: 0
HEADACHES: 0
BRUISES/BLEEDS EASILY: 0
CLAUDICATION: 0
WEIGHT LOSS: 0
DIZZINESS: 0
VOMITING: 0
BLOOD IN STOOL: 0
DOUBLE VISION: 0
CHILLS: 0

## 2025-05-21 ASSESSMENT — FIBROSIS 4 INDEX: FIB4 SCORE: 1.07

## 2025-05-21 NOTE — PROGRESS NOTES
Chief Complaint   Patient presents with    Chest Pain     F/V DX: Chest pain, unspecified type    Hypertension     F/V DX: Hypertension       Subjective     Young Carmella is a 64 y.o. female who presents today for cardiac care and evaluation for chest pain. Patient has chest pain at sporadic times. No specific precipitating factors or worsening factors. Chest pain is described to be pressure-like sensation however localized at anterior chest without radition. Lasting for seconds to minutes.    No family history of sudden cardiac death.    I have independently interpreted and reviewed echocardiogram's actual images with patient which showed normal left ventricular systolic function. No wall motion abnormality. No evidence of pulmonary hypertension. No significant valvular disease.    I have independently interpreted and reviewed stress test's actual EKG tracing with patient which showed normal left ventricular systolic function. No coronary ischemia.        Past Medical History:   Diagnosis Date    Breast cancer (HCC)      No past surgical history on file.  No family history on file.  Social History     Socioeconomic History    Marital status:      Spouse name: Not on file    Number of children: Not on file    Years of education: Not on file    Highest education level: Not on file   Occupational History    Not on file   Tobacco Use    Smoking status: Never    Smokeless tobacco: Never   Vaping Use    Vaping status: Never Used   Substance and Sexual Activity    Alcohol use: Never    Drug use: Never    Sexual activity: Not Currently     Partners: Male   Other Topics Concern    Not on file   Social History Narrative    Not on file     Social Drivers of Health     Financial Resource Strain: Not on file   Food Insecurity: Not on file   Transportation Needs: Not on file   Physical Activity: Not on file   Stress: Not on file   Social Connections: Not on file   Intimate Partner Violence: Not on file   Housing Stability:  Not on file     Allergies   Allergen Reactions    Morphine Itching and Unspecified     Outpatient Encounter Medications as of 5/21/2025   Medication Sig Dispense Refill    valsartan (DIOVAN) 160 MG Tab Take 1 Tablet by mouth every day. 100 Tablet 4    alendronate (FOSAMAX) 70 MG Tab TAKE 1 TABLET BY MOUTH ONE TIME PER WEEK 12 Tablet 0     No facility-administered encounter medications on file as of 5/21/2025.     Review of Systems   Constitutional:  Negative for chills, fever, malaise/fatigue and weight loss.   HENT:  Negative for ear discharge, ear pain, hearing loss and nosebleeds.    Eyes:  Negative for blurred vision, double vision, pain and discharge.   Respiratory:  Negative for cough and shortness of breath.    Cardiovascular:  Positive for chest pain. Negative for palpitations, orthopnea, claudication, leg swelling and PND.   Gastrointestinal:  Negative for abdominal pain, blood in stool, melena, nausea and vomiting.   Genitourinary:  Negative for dysuria and hematuria.   Musculoskeletal:  Negative for falls, joint pain and myalgias.   Skin:  Negative for itching and rash.   Neurological:  Negative for dizziness, sensory change, speech change, loss of consciousness and headaches.   Endo/Heme/Allergies:  Negative for environmental allergies. Does not bruise/bleed easily.   Psychiatric/Behavioral:  Negative for depression, hallucinations and suicidal ideas.               Objective     BP (!) 152/74 (BP Location: Left arm, Patient Position: Sitting, BP Cuff Size: Adult)   Pulse 68   Resp 16   Ht 1.524 m (5')   Wt 42.2 kg (93 lb)   SpO2 97%   BMI 18.16 kg/m²     Physical Exam  Vitals and nursing note reviewed.   Constitutional:       General: She is not in acute distress.     Appearance: She is not diaphoretic.   HENT:      Head: Normocephalic and atraumatic.      Right Ear: External ear normal.      Left Ear: External ear normal.      Nose: No congestion or rhinorrhea.   Eyes:      General:         Right  eye: No discharge.         Left eye: No discharge.   Neck:      Thyroid: No thyromegaly.      Vascular: No JVD.   Cardiovascular:      Rate and Rhythm: Normal rate and regular rhythm.      Pulses: Normal pulses.   Pulmonary:      Effort: No respiratory distress.   Abdominal:      General: There is no distension.      Tenderness: There is no abdominal tenderness.   Musculoskeletal:         General: No swelling or tenderness.      Right lower leg: No edema.      Left lower leg: No edema.   Skin:     General: Skin is warm and dry.   Neurological:      Mental Status: She is alert and oriented to person, place, and time.      Cranial Nerves: No cranial nerve deficit.   Psychiatric:         Behavior: Behavior normal.                Assessment & Plan     1. HTN (hypertension), malignant  valsartan (DIOVAN) 160 MG Tab      2. Type 2 diabetes mellitus with hyperglycemia, without long-term current use of insulin (HCC)  Basic Metabolic Panel    MICROALBUMIN CREAT RATIO URINE            Medical Decision Making: Today's Assessment/Status/Plan:   At this time patient is clinically stable in terms of her cardiac standpoint.    Blood pressure is very high today. Will start Valsartan 160 mg daily.    DM management with PMD.    This visit encounter signifies the visit complexity inherent to evaluation and management associated with medical care services that serve as the continuing focal point for all needed health care services and/or with medical care services that are part of ongoing care related to this patient's single, serious condition, complex cardiac condition.    Jesica Chu M.D.